# Patient Record
Sex: FEMALE
[De-identification: names, ages, dates, MRNs, and addresses within clinical notes are randomized per-mention and may not be internally consistent; named-entity substitution may affect disease eponyms.]

---

## 2018-01-03 ENCOUNTER — HOSPITAL ENCOUNTER (INPATIENT)
Dept: HOSPITAL 12 - ER | Age: 83
LOS: 7 days | DRG: 177 | End: 2018-01-10
Payer: MEDICARE

## 2018-01-03 VITALS — SYSTOLIC BLOOD PRESSURE: 132 MMHG | DIASTOLIC BLOOD PRESSURE: 56 MMHG

## 2018-01-03 VITALS — HEIGHT: 64 IN | BODY MASS INDEX: 24.75 KG/M2 | WEIGHT: 145 LBS

## 2018-01-03 VITALS — DIASTOLIC BLOOD PRESSURE: 48 MMHG | SYSTOLIC BLOOD PRESSURE: 114 MMHG

## 2018-01-03 DIAGNOSIS — R53.2: ICD-10-CM

## 2018-01-03 DIAGNOSIS — Z66: ICD-10-CM

## 2018-01-03 DIAGNOSIS — F02.80: ICD-10-CM

## 2018-01-03 DIAGNOSIS — R62.7: ICD-10-CM

## 2018-01-03 DIAGNOSIS — D64.9: ICD-10-CM

## 2018-01-03 DIAGNOSIS — I21.A1: ICD-10-CM

## 2018-01-03 DIAGNOSIS — G93.40: ICD-10-CM

## 2018-01-03 DIAGNOSIS — I50.31: ICD-10-CM

## 2018-01-03 DIAGNOSIS — I48.91: ICD-10-CM

## 2018-01-03 DIAGNOSIS — I11.0: ICD-10-CM

## 2018-01-03 DIAGNOSIS — K56.41: ICD-10-CM

## 2018-01-03 DIAGNOSIS — A41.9: ICD-10-CM

## 2018-01-03 DIAGNOSIS — G30.9: ICD-10-CM

## 2018-01-03 DIAGNOSIS — K56.7: ICD-10-CM

## 2018-01-03 DIAGNOSIS — D68.59: ICD-10-CM

## 2018-01-03 DIAGNOSIS — Z88.0: ICD-10-CM

## 2018-01-03 DIAGNOSIS — Z90.710: ICD-10-CM

## 2018-01-03 DIAGNOSIS — E87.2: ICD-10-CM

## 2018-01-03 DIAGNOSIS — E87.6: ICD-10-CM

## 2018-01-03 DIAGNOSIS — N17.0: ICD-10-CM

## 2018-01-03 DIAGNOSIS — J96.01: ICD-10-CM

## 2018-01-03 DIAGNOSIS — E43: ICD-10-CM

## 2018-01-03 DIAGNOSIS — R33.9: ICD-10-CM

## 2018-01-03 DIAGNOSIS — K64.9: ICD-10-CM

## 2018-01-03 DIAGNOSIS — E11.9: ICD-10-CM

## 2018-01-03 DIAGNOSIS — E87.1: ICD-10-CM

## 2018-01-03 DIAGNOSIS — J69.0: Primary | ICD-10-CM

## 2018-01-03 DIAGNOSIS — E03.9: ICD-10-CM

## 2018-01-03 LAB
ALP SERPL-CCNC: 54 U/L (ref 50–136)
ALT SERPL W/O P-5'-P-CCNC: 46 U/L (ref 14–59)
AST SERPL-CCNC: 24 U/L (ref 15–37)
BASOPHILS # BLD AUTO: 0 K/UL (ref 0–8)
BASOPHILS NFR BLD AUTO: 0.1 % (ref 0–2)
BILIRUB DIRECT SERPL-MCNC: 0.2 MG/DL (ref 0–0.2)
BILIRUB SERPL-MCNC: 0.6 MG/DL (ref 0.2–1)
BUN SERPL-MCNC: 50 MG/DL (ref 7–18)
CHLORIDE SERPL-SCNC: 98 MMOL/L (ref 98–107)
CO2 SERPL-SCNC: 25 MMOL/L (ref 21–32)
CREAT SERPL-MCNC: 1.7 MG/DL (ref 0.6–1.3)
EOSINOPHIL # BLD AUTO: 0.1 K/UL (ref 0–0.7)
EOSINOPHIL NFR BLD AUTO: 0.6 % (ref 0–7)
GLUCOSE SERPL-MCNC: 87 MG/DL (ref 74–106)
HCT VFR BLD AUTO: 34.9 % (ref 31.2–41.9)
HGB BLD-MCNC: 11.8 G/DL (ref 10.9–14.3)
LIPASE SERPL-CCNC: 71 U/L (ref 73–393)
LYMPHOCYTES # BLD AUTO: 1.4 K/UL (ref 20–40)
LYMPHOCYTES NFR BLD AUTO: 10.5 % (ref 20.5–51.5)
LYMPHOCYTES NFR BLD MANUAL: 10 % (ref 20–40)
MCH RBC QN AUTO: 29.6 UUG (ref 24.7–32.8)
MCHC RBC AUTO-ENTMCNC: 34 G/DL (ref 32.3–35.6)
MCV RBC AUTO: 87.6 FL (ref 75.5–95.3)
MONOCYTES # BLD AUTO: 1.8 K/UL (ref 2–10)
MONOCYTES NFR BLD AUTO: 14.1 % (ref 0–11)
MONOCYTES NFR BLD MANUAL: 15 % (ref 2–10)
NEUTROPHILS # BLD AUTO: 9.8 K/UL (ref 1.8–8.9)
NEUTROPHILS NFR BLD AUTO: 74.7 % (ref 38.5–71.5)
NEUTS BAND NFR BLD MANUAL: 20 % (ref 0–10)
NEUTS SEG NFR BLD MANUAL: 55 % (ref 42–75)
PLATELET # BLD AUTO: 378 K/UL (ref 179–408)
POTASSIUM SERPL-SCNC: 3.7 MMOL/L (ref 3.5–5.1)
RBC # BLD AUTO: 3.99 MIL/UL (ref 3.63–4.92)
WBC # BLD AUTO: 13.1 K/UL (ref 3.8–11.8)
WS STN SPEC: 5.6 G/DL (ref 6.4–8.2)

## 2018-01-03 PROCEDURE — C1751 CATH, INF, PER/CENT/MIDLINE: HCPCS

## 2018-01-03 PROCEDURE — A4663 DIALYSIS BLOOD PRESSURE CUFF: HCPCS

## 2018-01-03 RX ADMIN — QUETIAPINE PRN MG: 25 TABLET, FILM COATED ORAL at 21:18

## 2018-01-03 RX ADMIN — ATORVASTATIN CALCIUM SCH MG: 40 TABLET, FILM COATED ORAL at 21:17

## 2018-01-03 RX ADMIN — ANORECTAL OINTMENT PRN GM: 15.7; .44; 24; 20.6 OINTMENT TOPICAL at 21:00

## 2018-01-03 RX ADMIN — LACTULOSE SCH G: 20 SOLUTION ORAL at 17:17

## 2018-01-03 RX ADMIN — LACTULOSE SCH G: 20 SOLUTION ORAL at 20:00

## 2018-01-03 RX ADMIN — ACETAMINOPHEN PRN MG: 325 TABLET ORAL at 21:18

## 2018-01-03 RX ADMIN — DEXTROSE SCH MLS/HR: 50 INJECTION, SOLUTION INTRAVENOUS at 17:17

## 2018-01-03 RX ADMIN — LACTULOSE SCH G: 20 SOLUTION ORAL at 23:50

## 2018-01-03 RX ADMIN — ENOXAPARIN SODIUM SCH MG: 30 INJECTION SUBCUTANEOUS at 21:17

## 2018-01-03 RX ADMIN — SODIUM CHLORIDE PRN MLS/HR: 0.9 INJECTION, SOLUTION INTRAVENOUS at 19:00

## 2018-01-03 NOTE — NUR
PT TRANSFERED TO FLOOR IN STABLE CONDITION. PT GORT SOME RELIEF AFTER THE 
[place] CATH DRAINED 800ML URINE OUT.

## 2018-01-03 NOTE — NUR
REDNESS IN THE LABIAL AREA AND DIAPER AREA NOTICED AT THE TIME CHANGING THE 
DIAPER AND GIVING PERINEAL HYGIENE

## 2018-01-04 VITALS — DIASTOLIC BLOOD PRESSURE: 48 MMHG | SYSTOLIC BLOOD PRESSURE: 134 MMHG

## 2018-01-04 VITALS — DIASTOLIC BLOOD PRESSURE: 69 MMHG | SYSTOLIC BLOOD PRESSURE: 133 MMHG

## 2018-01-04 VITALS — DIASTOLIC BLOOD PRESSURE: 76 MMHG | SYSTOLIC BLOOD PRESSURE: 147 MMHG

## 2018-01-04 VITALS — DIASTOLIC BLOOD PRESSURE: 76 MMHG | SYSTOLIC BLOOD PRESSURE: 145 MMHG

## 2018-01-04 VITALS — SYSTOLIC BLOOD PRESSURE: 125 MMHG | DIASTOLIC BLOOD PRESSURE: 59 MMHG

## 2018-01-04 LAB
BASOPHILS # BLD AUTO: 0 K/UL (ref 0–8)
BASOPHILS NFR BLD AUTO: 0.1 % (ref 0–2)
BUN SERPL-MCNC: 36 MG/DL (ref 7–18)
CHLORIDE SERPL-SCNC: 105 MMOL/L (ref 98–107)
CHOLEST SERPL-MCNC: 120 MG/DL (ref ?–200)
CO2 SERPL-SCNC: 24 MMOL/L (ref 21–32)
CREAT SERPL-MCNC: 1.4 MG/DL (ref 0.6–1.3)
EOSINOPHIL # BLD AUTO: 0 K/UL (ref 0–0.7)
EOSINOPHIL NFR BLD AUTO: 0.4 % (ref 0–7)
GLUCOSE SERPL-MCNC: 98 MG/DL (ref 74–106)
HCT VFR BLD AUTO: 37.7 % (ref 31.2–41.9)
HDLC SERPL-MCNC: 51 MG/DL (ref 40–60)
HGB BLD-MCNC: 12.8 G/DL (ref 10.9–14.3)
LYMPHOCYTES # BLD AUTO: 0.5 K/UL (ref 20–40)
LYMPHOCYTES NFR BLD AUTO: 4 % (ref 20.5–51.5)
MAGNESIUM SERPL-MCNC: 1.6 MG/DL (ref 1.8–2.4)
MCH RBC QN AUTO: 29.6 UUG (ref 24.7–32.8)
MCHC RBC AUTO-ENTMCNC: 34 G/DL (ref 32.3–35.6)
MCV RBC AUTO: 87.4 FL (ref 75.5–95.3)
MONOCYTES # BLD AUTO: 0.6 K/UL (ref 2–10)
MONOCYTES NFR BLD AUTO: 5 % (ref 0–11)
NEUTROPHILS # BLD AUTO: 10.7 K/UL (ref 1.8–8.9)
NEUTROPHILS NFR BLD AUTO: 90.5 % (ref 38.5–71.5)
PHOSPHATE SERPL-MCNC: 2.2 MG/DL (ref 2.5–4.9)
PLATELET # BLD AUTO: 427 K/UL (ref 179–408)
POTASSIUM SERPL-SCNC: 3.4 MMOL/L (ref 3.5–5.1)
RBC # BLD AUTO: 4.31 MIL/UL (ref 3.63–4.92)
TRIGL SERPL-MCNC: 98 MG/DL (ref 30–150)
TSH SERPL DL<=0.005 MIU/L-ACNC: 1.74 MIU/ML (ref 0.36–3.74)
WBC # BLD AUTO: 11.8 K/UL (ref 3.8–11.8)

## 2018-01-04 RX ADMIN — MAGNESIUM SULFATE IN DEXTROSE SCH MLS/HR: 10 INJECTION, SOLUTION INTRAVENOUS at 14:08

## 2018-01-04 RX ADMIN — MAGNESIUM SULFATE IN DEXTROSE SCH MLS/HR: 10 INJECTION, SOLUTION INTRAVENOUS at 15:13

## 2018-01-04 RX ADMIN — LACTULOSE SCH G: 20 SOLUTION ORAL at 04:00

## 2018-01-04 RX ADMIN — MAGNESIUM SULFATE IN DEXTROSE SCH MLS/HR: 10 INJECTION, SOLUTION INTRAVENOUS at 13:58

## 2018-01-04 RX ADMIN — ATORVASTATIN CALCIUM SCH MG: 40 TABLET, FILM COATED ORAL at 20:26

## 2018-01-04 RX ADMIN — SODIUM CHLORIDE PRN MLS/HR: 0.9 INJECTION, SOLUTION INTRAVENOUS at 08:28

## 2018-01-04 RX ADMIN — POTASSIUM CHLORIDE SCH MLS/HR: 14.9 INJECTION, SOLUTION INTRAVENOUS at 16:47

## 2018-01-04 RX ADMIN — POTASSIUM CHLORIDE SCH MLS/HR: 14.9 INJECTION, SOLUTION INTRAVENOUS at 17:42

## 2018-01-04 RX ADMIN — ENOXAPARIN SODIUM SCH MG: 30 INJECTION SUBCUTANEOUS at 20:28

## 2018-01-04 RX ADMIN — HYDROCODONE BITARTRATE AND ACETAMINOPHEN PRN TAB: 5; 325 TABLET ORAL at 20:27

## 2018-01-04 RX ADMIN — LACTULOSE SCH G: 20 SOLUTION ORAL at 08:28

## 2018-01-04 RX ADMIN — DEXTROSE SCH MLS/HR: 50 INJECTION, SOLUTION INTRAVENOUS at 17:42

## 2018-01-04 RX ADMIN — Medication SCH MCG: at 06:49

## 2018-01-04 NOTE — NUR
RECEIVED SHIFT REPORT FROM DAY SHIFT NURSE. PATIENT IS A/OX2, COMPLAINING OF ABDOMINAL PAIN. 
PAIN MANAGEMENT WILL BE PROVIDED. STABLE CONDITION, NO S/S OF DISTRESS, VSS. CAREGIVER AT 
BEDSIDE. BED ALARM ON, SIDE RAILS UP X2, BED IN LOCKED/LOW POSITION, CALL LIGHT WITHIN 
REACH. SAFETY/COMFORT WILL BE PROVIDED.

## 2018-01-04 NOTE — NUR
CONTINUES IV TO RIGHT UPPER MIDLINE VIA PUMP; SITE CLEAR.  

CONTINUES BROWN DIARRHEA & RED EXCORIATED PERIRECTAL AREA.

## 2018-01-04 NOTE — NUR
PATIENT BEEN IN BED RESTING. NO S/S OF DISTRESS NOTED DURING MY SHIFT. CAREGIVER AT THE 
BEDSIDE DURING THE DAY. K AND Mg WAS SUPPLEMENTED AS ORDERED. INFLUENZA SWAP WAS TAKEN AND 
SENT TO LAB. F/C DRAINING WELL. IV STILL INTACT, IV FLUID RUNNING. SAFETY AND COMFORT 
PROVIDED BY STAFF. REPORT WAS GIVEN TO JENNIFER HOLLAND.

## 2018-01-05 VITALS — DIASTOLIC BLOOD PRESSURE: 68 MMHG | SYSTOLIC BLOOD PRESSURE: 141 MMHG

## 2018-01-05 VITALS — SYSTOLIC BLOOD PRESSURE: 161 MMHG | DIASTOLIC BLOOD PRESSURE: 72 MMHG

## 2018-01-05 VITALS — DIASTOLIC BLOOD PRESSURE: 75 MMHG | SYSTOLIC BLOOD PRESSURE: 142 MMHG

## 2018-01-05 LAB
ALP SERPL-CCNC: 60 U/L (ref 50–136)
ALT SERPL W/O P-5'-P-CCNC: 41 U/L (ref 14–59)
AST SERPL-CCNC: 29 U/L (ref 15–37)
BASOPHILS # BLD AUTO: 0 K/UL (ref 0–8)
BASOPHILS NFR BLD AUTO: 0.1 % (ref 0–2)
BILIRUB SERPL-MCNC: 0.4 MG/DL (ref 0.2–1)
BUN SERPL-MCNC: 34 MG/DL (ref 7–18)
CHLORIDE SERPL-SCNC: 103 MMOL/L (ref 98–107)
CO2 SERPL-SCNC: 24 MMOL/L (ref 21–32)
CREAT SERPL-MCNC: 1.2 MG/DL (ref 0.6–1.3)
EOSINOPHIL # BLD AUTO: 0 K/UL (ref 0–0.7)
EOSINOPHIL NFR BLD AUTO: 0.2 % (ref 0–7)
GLUCOSE SERPL-MCNC: 113 MG/DL (ref 74–106)
HCT VFR BLD AUTO: 33.9 % (ref 31.2–41.9)
HEMOCCULT STL QL: POSITIVE
HGB BLD-MCNC: 11.6 G/DL (ref 10.9–14.3)
LYMPHOCYTES # BLD AUTO: 1.5 K/UL (ref 20–40)
LYMPHOCYTES NFR BLD AUTO: 10 % (ref 20.5–51.5)
LYMPHOCYTES NFR BLD MANUAL: 7 % (ref 20–40)
MAGNESIUM SERPL-MCNC: 2 MG/DL (ref 1.8–2.4)
MCH RBC QN AUTO: 29.8 UUG (ref 24.7–32.8)
MCHC RBC AUTO-ENTMCNC: 34 G/DL (ref 32.3–35.6)
MCV RBC AUTO: 86.9 FL (ref 75.5–95.3)
MONOCYTES # BLD AUTO: 1.8 K/UL (ref 2–10)
MONOCYTES NFR BLD AUTO: 12.3 % (ref 0–11)
MONOCYTES NFR BLD MANUAL: 15 % (ref 2–10)
NEUTROPHILS # BLD AUTO: 11.6 K/UL (ref 1.8–8.9)
NEUTROPHILS NFR BLD AUTO: 77.4 % (ref 38.5–71.5)
NEUTS BAND NFR BLD MANUAL: 34 % (ref 0–10)
NEUTS SEG NFR BLD MANUAL: 44 % (ref 42–75)
PHOSPHATE SERPL-MCNC: 1.8 MG/DL (ref 2.5–4.9)
PLATELET # BLD AUTO: 413 K/UL (ref 179–408)
POTASSIUM SERPL-SCNC: 3.4 MMOL/L (ref 3.5–5.1)
RBC # BLD AUTO: 3.9 MIL/UL (ref 3.63–4.92)
WBC # BLD AUTO: 15 K/UL (ref 3.8–11.8)
WS STN SPEC: 5.3 G/DL (ref 6.4–8.2)

## 2018-01-05 RX ADMIN — DOCUSATE SODIUM SCH MG: 100 CAPSULE, LIQUID FILLED ORAL at 21:00

## 2018-01-05 RX ADMIN — DOCUSATE SODIUM SCH MG: 100 CAPSULE, LIQUID FILLED ORAL at 21:12

## 2018-01-05 RX ADMIN — SODIUM CHLORIDE PRN MLS/HR: 0.9 INJECTION, SOLUTION INTRAVENOUS at 01:04

## 2018-01-05 RX ADMIN — ATORVASTATIN CALCIUM SCH MG: 40 TABLET, FILM COATED ORAL at 21:12

## 2018-01-05 RX ADMIN — SENNOSIDES SCH TAB: 8.6 TABLET, COATED ORAL at 21:00

## 2018-01-05 RX ADMIN — SENNOSIDES SCH TAB: 8.6 TABLET, COATED ORAL at 21:12

## 2018-01-05 RX ADMIN — SODIUM CHLORIDE PRN MLS/HR: 0.9 INJECTION, SOLUTION INTRAVENOUS at 20:09

## 2018-01-05 RX ADMIN — DEXTROSE SCH MLS/HR: 50 INJECTION, SOLUTION INTRAVENOUS at 18:01

## 2018-01-05 RX ADMIN — QUETIAPINE SCH MG: 25 TABLET, FILM COATED ORAL at 21:13

## 2018-01-05 RX ADMIN — Medication SCH MCG: at 06:29

## 2018-01-05 RX ADMIN — POTASSIUM CHLORIDE SCH MLS/HR: 14.9 INJECTION, SOLUTION INTRAVENOUS at 17:24

## 2018-01-05 RX ADMIN — POTASSIUM CHLORIDE SCH MLS/HR: 14.9 INJECTION, SOLUTION INTRAVENOUS at 18:02

## 2018-01-05 RX ADMIN — SODIUM CHLORIDE PRN MLS/HR: 0.9 INJECTION, SOLUTION INTRAVENOUS at 17:00

## 2018-01-05 RX ADMIN — ENOXAPARIN SODIUM SCH MG: 30 INJECTION SUBCUTANEOUS at 21:14

## 2018-01-05 NOTE — NUR
PATIENT SLEPT INTERMITTENTLY THROUGH THE NIGHT. CONTINUED TO WAKE UP, BE FEARFUL/ANXIOUS 
WITH CONFUSION. REORIENTATION PROVIDED, PATIENT CHANGED 3-4 TIMES THROUGH THE NIGHT AND 
PRESENTED STOOL. STOOL SPECIMEN COLLECTED FOR STOOL OB. STOOL OB RESULTED AND IS POSITIVE 
FOR STOOL OB. PATIENT'S VITALS ARE STABLE, STABLE CONDITION, NO S/S OF DISTRESS. WHITMORE 
EMPTIED. PATIENT CLEANED. BED IN LOCKED/LOW POSITION, SIDE RAILS UP X2, BED ALARM ON, CALL 
LIGHT WITHIN REACH.

## 2018-01-05 NOTE — NUR
PATIENT BEEN IN BED SLEEPING INTERMITTENTLY. NO S/S OF DISTRESS NOTED. CAREGIVER AT THE 
BEDSIDE DURING THE DAY. PATIENT CONTINUE HAVING SOFT BROWN STOOLS, NO BLEEDING WAS NOTED. PT 
EVALUATION WAS DONE. PATIENT CONTINUE TO BE WEAK UNABLE TO WALK WITHOUT ASSISTANCE. 
MEDICATIONS WERE GIVEN AS ORDERED. IV STILL INTACT AND PATENT. SAFETY AND COMFORT PROVIDED 
BY THE STAFF. REPORT GIVEN TO NIGHT SHIFT RN.

## 2018-01-06 VITALS — DIASTOLIC BLOOD PRESSURE: 70 MMHG | SYSTOLIC BLOOD PRESSURE: 153 MMHG

## 2018-01-06 VITALS — SYSTOLIC BLOOD PRESSURE: 153 MMHG | DIASTOLIC BLOOD PRESSURE: 70 MMHG

## 2018-01-06 VITALS — DIASTOLIC BLOOD PRESSURE: 66 MMHG | SYSTOLIC BLOOD PRESSURE: 112 MMHG

## 2018-01-06 VITALS — SYSTOLIC BLOOD PRESSURE: 147 MMHG | DIASTOLIC BLOOD PRESSURE: 59 MMHG

## 2018-01-06 VITALS — DIASTOLIC BLOOD PRESSURE: 74 MMHG | SYSTOLIC BLOOD PRESSURE: 133 MMHG

## 2018-01-06 LAB
BASOPHILS # BLD AUTO: 0 K/UL (ref 0–8)
BASOPHILS NFR BLD AUTO: 0.1 % (ref 0–2)
BUN SERPL-MCNC: 29 MG/DL (ref 7–18)
CHLORIDE SERPL-SCNC: 103 MMOL/L (ref 98–107)
CO2 SERPL-SCNC: 21 MMOL/L (ref 21–32)
CREAT SERPL-MCNC: 0.9 MG/DL (ref 0.6–1.3)
EOSINOPHIL # BLD AUTO: 0.1 K/UL (ref 0–0.7)
EOSINOPHIL NFR BLD AUTO: 0.4 % (ref 0–7)
GLUCOSE SERPL-MCNC: 113 MG/DL (ref 74–106)
HCT VFR BLD AUTO: 34.7 % (ref 31.2–41.9)
HGB BLD-MCNC: 11.6 G/DL (ref 10.9–14.3)
LYMPHOCYTES # BLD AUTO: 1.7 K/UL (ref 20–40)
LYMPHOCYTES NFR BLD AUTO: 13.7 % (ref 20.5–51.5)
MAGNESIUM SERPL-MCNC: 1.6 MG/DL (ref 1.8–2.4)
MCH RBC QN AUTO: 29 UUG (ref 24.7–32.8)
MCHC RBC AUTO-ENTMCNC: 33 G/DL (ref 32.3–35.6)
MCV RBC AUTO: 86.8 FL (ref 75.5–95.3)
MONOCYTES # BLD AUTO: 1.7 K/UL (ref 2–10)
MONOCYTES NFR BLD AUTO: 13.6 % (ref 0–11)
NEUTROPHILS # BLD AUTO: 9 K/UL (ref 1.8–8.9)
NEUTROPHILS NFR BLD AUTO: 72.2 % (ref 38.5–71.5)
PHOSPHATE SERPL-MCNC: 1 MG/DL (ref 2.5–4.9)
PLATELET # BLD AUTO: 409 K/UL (ref 179–408)
POTASSIUM SERPL-SCNC: 3.3 MMOL/L (ref 3.5–5.1)
RBC # BLD AUTO: 3.99 MIL/UL (ref 3.63–4.92)
WBC # BLD AUTO: 12.4 K/UL (ref 3.8–11.8)

## 2018-01-06 RX ADMIN — SODIUM CHLORIDE PRN MLS/HR: 0.9 INJECTION, SOLUTION INTRAVENOUS at 06:25

## 2018-01-06 RX ADMIN — NYSTATIN SCH APPLIC: 100000 CREAM TOPICAL at 20:36

## 2018-01-06 RX ADMIN — ENOXAPARIN SODIUM SCH MG: 30 INJECTION SUBCUTANEOUS at 20:37

## 2018-01-06 RX ADMIN — SENNOSIDES SCH TAB: 8.6 TABLET, COATED ORAL at 20:35

## 2018-01-06 RX ADMIN — LACTULOSE SCH G: 20 SOLUTION ORAL at 13:19

## 2018-01-06 RX ADMIN — ATORVASTATIN CALCIUM SCH MG: 40 TABLET, FILM COATED ORAL at 20:35

## 2018-01-06 RX ADMIN — Medication SCH MCG: at 06:24

## 2018-01-06 RX ADMIN — QUETIAPINE SCH MG: 25 TABLET, FILM COATED ORAL at 20:35

## 2018-01-06 RX ADMIN — LACTULOSE SCH G: 20 SOLUTION ORAL at 17:50

## 2018-01-06 RX ADMIN — MAGNESIUM SULFATE IN DEXTROSE SCH MLS/HR: 10 INJECTION, SOLUTION INTRAVENOUS at 11:10

## 2018-01-06 RX ADMIN — SODIUM CHLORIDE SCH MLS/HR: 9 INJECTION, SOLUTION INTRAVENOUS at 17:50

## 2018-01-06 RX ADMIN — MAGNESIUM SULFATE IN DEXTROSE SCH MLS/HR: 10 INJECTION, SOLUTION INTRAVENOUS at 12:22

## 2018-01-06 RX ADMIN — SODIUM CHLORIDE SCH MLS/HR: 9 INJECTION, SOLUTION INTRAVENOUS at 13:21

## 2018-01-06 RX ADMIN — DOCUSATE SODIUM SCH MG: 100 CAPSULE, LIQUID FILLED ORAL at 20:35

## 2018-01-06 RX ADMIN — DEXTROSE SCH MLS/HR: 50 INJECTION, SOLUTION INTRAVENOUS at 16:57

## 2018-01-06 NOTE — NUR
repositioned q 2h with pillows for support, incontinent of liquid stool, washed and kept 
clean and dry- z guard applied on reddened areas

## 2018-01-06 NOTE — NUR
asleep but arouses easily, oriented to self only, denies of pain, repositioned and readied 
for breakfast, head of bed elevated, blevins cath draining rajesh urine,  safety measures 
maintained- bed alarm on with call light within reach

## 2018-01-06 NOTE — NUR
RECEIVED PATIENT AWAKE IN BED WITH CAREGIVER AT BEDSIDE. PATIENT IS ALERT TO SELF ONLY. 
CONFUSED BUT VERY PLEASANT WHEN APPROACHED. DENIES PAIN. NO S/S OF PAIN OR DISCOMFORT. NO 
RESP. DISTRESS NOTED. F/C INTACT AND PATENT. BED ALARM ON. CALL LIGHT IN REACH. ALL NEEDS 
ATTENDED. WILL CONTINUE TO MONITOR.

## 2018-01-06 NOTE — NUR
resting in bed, no distress noted, all needs attended and met, confused, repositioned q2h 
with heels off loaded with pillows, no redness noted on heels, call ligth within reach, bed 
alarm on

## 2018-01-07 VITALS — SYSTOLIC BLOOD PRESSURE: 117 MMHG | DIASTOLIC BLOOD PRESSURE: 67 MMHG

## 2018-01-07 VITALS — DIASTOLIC BLOOD PRESSURE: 63 MMHG | SYSTOLIC BLOOD PRESSURE: 132 MMHG

## 2018-01-07 VITALS — SYSTOLIC BLOOD PRESSURE: 126 MMHG | DIASTOLIC BLOOD PRESSURE: 59 MMHG

## 2018-01-07 VITALS — DIASTOLIC BLOOD PRESSURE: 59 MMHG | SYSTOLIC BLOOD PRESSURE: 147 MMHG

## 2018-01-07 VITALS — DIASTOLIC BLOOD PRESSURE: 58 MMHG | SYSTOLIC BLOOD PRESSURE: 134 MMHG

## 2018-01-07 LAB
BASOPHILS # BLD AUTO: 0 K/UL (ref 0–8)
BASOPHILS NFR BLD AUTO: 0.1 % (ref 0–2)
BUN SERPL-MCNC: 25 MG/DL (ref 7–18)
CHLORIDE SERPL-SCNC: 102 MMOL/L (ref 98–107)
CO2 SERPL-SCNC: 22 MMOL/L (ref 21–32)
CREAT SERPL-MCNC: 0.8 MG/DL (ref 0.6–1.3)
EOSINOPHIL # BLD AUTO: 0 K/UL (ref 0–0.7)
EOSINOPHIL NFR BLD AUTO: 0.4 % (ref 0–7)
GLUCOSE SERPL-MCNC: 121 MG/DL (ref 74–106)
HCT VFR BLD AUTO: 35 % (ref 31.2–41.9)
HGB BLD-MCNC: 11.6 G/DL (ref 10.9–14.3)
LYMPHOCYTES # BLD AUTO: 1.5 K/UL (ref 20–40)
LYMPHOCYTES NFR BLD AUTO: 12.9 % (ref 20.5–51.5)
MAGNESIUM SERPL-MCNC: 2 MG/DL (ref 1.8–2.4)
MCH RBC QN AUTO: 28.9 UUG (ref 24.7–32.8)
MCHC RBC AUTO-ENTMCNC: 33 G/DL (ref 32.3–35.6)
MCV RBC AUTO: 86.8 FL (ref 75.5–95.3)
MONOCYTES # BLD AUTO: 1.1 K/UL (ref 2–10)
MONOCYTES NFR BLD AUTO: 9.7 % (ref 0–11)
NEUTROPHILS # BLD AUTO: 8.7 K/UL (ref 1.8–8.9)
NEUTROPHILS NFR BLD AUTO: 76.9 % (ref 38.5–71.5)
PHOSPHATE SERPL-MCNC: 1.6 MG/DL (ref 2.5–4.9)
PLATELET # BLD AUTO: 388 K/UL (ref 179–408)
POTASSIUM SERPL-SCNC: 2.8 MMOL/L (ref 3.5–5.1)
RBC # BLD AUTO: 4.03 MIL/UL (ref 3.63–4.92)
WBC # BLD AUTO: 11.3 K/UL (ref 3.8–11.8)

## 2018-01-07 RX ADMIN — QUETIAPINE SCH MG: 25 TABLET, FILM COATED ORAL at 20:37

## 2018-01-07 RX ADMIN — NYSTATIN SCH APPLIC: 100000 CREAM TOPICAL at 20:37

## 2018-01-07 RX ADMIN — LACTULOSE SCH G: 20 SOLUTION ORAL at 12:38

## 2018-01-07 RX ADMIN — DEXTROSE SCH MLS/HR: 50 INJECTION, SOLUTION INTRAVENOUS at 19:12

## 2018-01-07 RX ADMIN — SENNOSIDES SCH TAB: 8.6 TABLET, COATED ORAL at 20:37

## 2018-01-07 RX ADMIN — LACTULOSE SCH G: 20 SOLUTION ORAL at 00:00

## 2018-01-07 RX ADMIN — SODIUM CHLORIDE PRN MLS/HR: 0.9 INJECTION, SOLUTION INTRAVENOUS at 00:26

## 2018-01-07 RX ADMIN — DEXTROSE SCH MLS/HR: 50 INJECTION, SOLUTION INTRAVENOUS at 17:55

## 2018-01-07 RX ADMIN — ATORVASTATIN CALCIUM SCH MG: 40 TABLET, FILM COATED ORAL at 20:37

## 2018-01-07 RX ADMIN — SODIUM CHLORIDE PRN MLS/HR: 0.9 INJECTION, SOLUTION INTRAVENOUS at 21:59

## 2018-01-07 RX ADMIN — DEXTROSE SCH MLS/HR: 50 INJECTION, SOLUTION INTRAVENOUS at 16:35

## 2018-01-07 RX ADMIN — LACTULOSE SCH G: 20 SOLUTION ORAL at 17:39

## 2018-01-07 RX ADMIN — LACTULOSE SCH G: 20 SOLUTION ORAL at 06:17

## 2018-01-07 RX ADMIN — DEXTROSE SCH MLS/HR: 50 INJECTION, SOLUTION INTRAVENOUS at 17:39

## 2018-01-07 RX ADMIN — NYSTATIN SCH APPLIC: 100000 CREAM TOPICAL at 09:13

## 2018-01-07 RX ADMIN — DEXTROSE SCH MLS/HR: 50 INJECTION, SOLUTION INTRAVENOUS at 20:34

## 2018-01-07 RX ADMIN — SODIUM CHLORIDE PRN MLS/HR: 0.9 INJECTION, SOLUTION INTRAVENOUS at 10:35

## 2018-01-07 RX ADMIN — DOCUSATE SODIUM SCH MG: 100 CAPSULE, LIQUID FILLED ORAL at 20:36

## 2018-01-07 RX ADMIN — Medication SCH MCG: at 06:17

## 2018-01-07 RX ADMIN — ENOXAPARIN SODIUM SCH MG: 30 INJECTION SUBCUTANEOUS at 20:37

## 2018-01-07 NOTE — NUR
personal care gier at bedside, incontinent of liquid stool, brownish, cleaned and kept clean 
and dry- z guard and nystatin cream applied to reddened areas

## 2018-01-07 NOTE — NUR
resting in bed, daughter and personal care giver at bedside, no distress noted, had a large 
bowel movement, washed and kept clean and dry, all needs attended and met, repositioned q 2h 
with heels off loaded with pillows

## 2018-01-07 NOTE — NUR
awake, confused, denies of pain, repositioned for breakfast and fed by cna, aspiration 
precautions observed, head of bed elevated

## 2018-01-07 NOTE — NUR
RECEIVED PATIENT AWAKE IN BED WITH PRIVATE CAREGIVER AT BEDSIDE. PATIENT IS ALERT TO SELF 
ONLY. VERY CONFUSED AND DISORIENTED, BUT APPROPRIATE AND PLEASANT WHEN APPROACHED. NEEDS 
FREQUENT REDIRECTION DUE TO MENTAL STATUS. DENIES PAIN. NO FACIAL GRIMACE NOTED. VSS. F/C 
INTACT AND PATENT. H/L INTACT. BED ALARM ON. CALL LIGHT IN REACH. ALL NEEDS ATTENDED. WILL 
CONTINUE TO MONITOR.

## 2018-01-08 VITALS — DIASTOLIC BLOOD PRESSURE: 74 MMHG | SYSTOLIC BLOOD PRESSURE: 152 MMHG

## 2018-01-08 VITALS — SYSTOLIC BLOOD PRESSURE: 135 MMHG | DIASTOLIC BLOOD PRESSURE: 66 MMHG

## 2018-01-08 VITALS — SYSTOLIC BLOOD PRESSURE: 128 MMHG | DIASTOLIC BLOOD PRESSURE: 68 MMHG

## 2018-01-08 VITALS — DIASTOLIC BLOOD PRESSURE: 51 MMHG | SYSTOLIC BLOOD PRESSURE: 120 MMHG

## 2018-01-08 LAB
BASOPHILS # BLD AUTO: 0 K/UL (ref 0–8)
BASOPHILS # BLD AUTO: 0.1 K/UL (ref 0–8)
BASOPHILS NFR BLD AUTO: 0 % (ref 0–2)
BASOPHILS NFR BLD AUTO: 0.3 % (ref 0–2)
BUN SERPL-MCNC: 29 MG/DL (ref 7–18)
BUN SERPL-MCNC: 36 MG/DL (ref 7–18)
CHLORIDE SERPL-SCNC: 102 MMOL/L (ref 98–107)
CHLORIDE SERPL-SCNC: 99 MMOL/L (ref 98–107)
CO2 SERPL-SCNC: 20 MMOL/L (ref 21–32)
CO2 SERPL-SCNC: 20 MMOL/L (ref 21–32)
CREAT SERPL-MCNC: 0.8 MG/DL (ref 0.6–1.3)
CREAT SERPL-MCNC: 1.3 MG/DL (ref 0.6–1.3)
EOSINOPHIL # BLD AUTO: 0 K/UL (ref 0–0.7)
EOSINOPHIL # BLD AUTO: 0 K/UL (ref 0–0.7)
EOSINOPHIL NFR BLD AUTO: 0 % (ref 0–7)
EOSINOPHIL NFR BLD AUTO: 0 % (ref 0–7)
GLUCOSE SERPL-MCNC: 147 MG/DL (ref 74–106)
GLUCOSE SERPL-MCNC: 225 MG/DL (ref 74–106)
HCT VFR BLD AUTO: 36.6 % (ref 31.2–41.9)
HCT VFR BLD AUTO: 38.5 % (ref 31.2–41.9)
HEMOCCULT STL QL: POSITIVE
HGB BLD-MCNC: 12.3 G/DL (ref 10.9–14.3)
HGB BLD-MCNC: 13.2 G/DL (ref 10.9–14.3)
LYMPHOCYTES # BLD AUTO: 0.4 K/UL (ref 20–40)
LYMPHOCYTES # BLD AUTO: 0.6 K/UL (ref 20–40)
LYMPHOCYTES NFR BLD AUTO: 3.1 % (ref 20.5–51.5)
LYMPHOCYTES NFR BLD AUTO: 3.1 % (ref 20.5–51.5)
LYMPHOCYTES NFR BLD MANUAL: 7 % (ref 20–40)
MAGNESIUM SERPL-MCNC: 1.7 MG/DL (ref 1.8–2.4)
MCH RBC QN AUTO: 29.5 UUG (ref 24.7–32.8)
MCH RBC QN AUTO: 30.1 UUG (ref 24.7–32.8)
MCHC RBC AUTO-ENTMCNC: 34 G/DL (ref 32.3–35.6)
MCHC RBC AUTO-ENTMCNC: 34 G/DL (ref 32.3–35.6)
MCV RBC AUTO: 87.5 FL (ref 75.5–95.3)
MCV RBC AUTO: 88 FL (ref 75.5–95.3)
MONOCYTES # BLD AUTO: 1 K/UL (ref 2–10)
MONOCYTES # BLD AUTO: 1.8 K/UL (ref 2–10)
MONOCYTES NFR BLD AUTO: 7.4 % (ref 0–11)
MONOCYTES NFR BLD AUTO: 9.9 % (ref 0–11)
MONOCYTES NFR BLD MANUAL: 8 % (ref 2–10)
NEUTROPHILS # BLD AUTO: 12.6 K/UL (ref 1.8–8.9)
NEUTROPHILS # BLD AUTO: 15.9 K/UL (ref 1.8–8.9)
NEUTROPHILS NFR BLD AUTO: 86.7 % (ref 38.5–71.5)
NEUTROPHILS NFR BLD AUTO: 89.5 % (ref 38.5–71.5)
NEUTS SEG NFR BLD MANUAL: 85 % (ref 42–75)
PHOSPHATE SERPL-MCNC: 2 MG/DL (ref 2.5–4.9)
PLATELET # BLD AUTO: 378 K/UL (ref 179–408)
PLATELET # BLD AUTO: 410 K/UL (ref 179–408)
POTASSIUM SERPL-SCNC: 3.4 MMOL/L (ref 3.5–5.1)
POTASSIUM SERPL-SCNC: 3.8 MMOL/L (ref 3.5–5.1)
RBC # BLD AUTO: 4.18 MIL/UL (ref 3.63–4.92)
RBC # BLD AUTO: 4.38 MIL/UL (ref 3.63–4.92)
WBC # BLD AUTO: 14 K/UL (ref 3.8–11.8)
WBC # BLD AUTO: 18.4 K/UL (ref 3.8–11.8)

## 2018-01-08 RX ADMIN — QUETIAPINE SCH MG: 25 TABLET, FILM COATED ORAL at 22:58

## 2018-01-08 RX ADMIN — SODIUM CHLORIDE SCH MLS/HR: 9 INJECTION, SOLUTION INTRAVENOUS at 14:40

## 2018-01-08 RX ADMIN — NYSTATIN SCH APPLIC: 100000 CREAM TOPICAL at 08:13

## 2018-01-08 RX ADMIN — DEXTROSE SCH MLS/HR: 50 INJECTION, SOLUTION INTRAVENOUS at 16:42

## 2018-01-08 RX ADMIN — SODIUM CHLORIDE PRN MLS/HR: 0.9 INJECTION, SOLUTION INTRAVENOUS at 10:18

## 2018-01-08 RX ADMIN — LACTULOSE SCH G: 20 SOLUTION ORAL at 11:13

## 2018-01-08 RX ADMIN — ATORVASTATIN CALCIUM SCH MG: 40 TABLET, FILM COATED ORAL at 22:57

## 2018-01-08 RX ADMIN — Medication SCH MCG: at 06:05

## 2018-01-08 RX ADMIN — LACTULOSE SCH G: 20 SOLUTION ORAL at 05:11

## 2018-01-08 RX ADMIN — SODIUM CHLORIDE SCH MLS/HR: 9 INJECTION, SOLUTION INTRAVENOUS at 11:30

## 2018-01-08 RX ADMIN — LACTULOSE SCH G: 20 SOLUTION ORAL at 17:10

## 2018-01-08 RX ADMIN — SENNOSIDES SCH TAB: 8.6 TABLET, COATED ORAL at 22:57

## 2018-01-08 RX ADMIN — DOCUSATE SODIUM SCH MG: 100 CAPSULE, LIQUID FILLED ORAL at 22:57

## 2018-01-08 RX ADMIN — ENOXAPARIN SODIUM SCH MG: 30 INJECTION SUBCUTANEOUS at 21:00

## 2018-01-08 RX ADMIN — LACTULOSE SCH G: 20 SOLUTION ORAL at 00:11

## 2018-01-08 RX ADMIN — NYSTATIN SCH APPLIC: 100000 CREAM TOPICAL at 22:58

## 2018-01-08 NOTE — NUR
RECEIVED REPORT FROM NURSE REGARDING PATIENT. RECEIVED INFORMATION THAT PATIENTS DISCHARGE 
IS ON HOLD DUE TO POSITIVE STOOL FOR OB. WAITING FOR PATIENT TO HAVE ANOTHER STOOL AND 
ASSESS IS BLOOD IS STILL PRESENT. WILL CONTINUE TO MONITOR.

## 2018-01-08 NOTE — NUR
PATIENT APPEARS MORE AWAKE AND ALERT AT THIS TIME. DAUGHTER AT BEDSIDE. CALLED OUT TO 
EXCHANGE TO MD ON CALL FOR FURTHER ORDERS.

## 2018-01-08 NOTE — NUR
CODE STROKE PROTOCOL ORDERS COMPLETE. CT SCAN DONE, C-XRAY DONE, LABS PER PROTOCOL DRAWN. 
BRONSON MANZO NP, AWARE AND NOTIFIED. NP AWARE OF CHEST X-RAY RESULTS. RECEIVED ADDITIONAL 
ORDER FOR PATIENT TO HAVE CT SCAN OF ABDOMEN. WILL CONTINUE TO MONITOR.

## 2018-01-08 NOTE — NUR
PATIENT IN BED AWAKE DAUGHTER IS AT BED SIDE, PATIENT CHANGED 3-4 TIMES THROUGH THE DAY AND 
PRESENTED STOOL. STOOL SPECIMEN COLLECTED FOR STOOL OB. STOOL OB RESULTED AND IS POSITIVE 
FOR STOOL OB.MD MADE AWARE, PATIENT'S VITALS ARE STABLE, STABLE CONDITION, NO S/S OF 
DISTRESS. . BED IN LOCKED/LOW POSITION, SIDE RAILS UP X2, BED ALARM ON, CALL LIGHT WITHIN 
REACH.

## 2018-01-08 NOTE — NUR
BRONSON MANZO NP ON CALL FOR DR. DARIUS FATIMA. NOTIFIED OF PATIENTS CHANGE OF CONDITION 
AND CODE STROKE. WAITING FOR CT RESULTS.

## 2018-01-08 NOTE — NUR
RECEIVED PATIENT AWAKE IN BED. DAUGHTER AND PRIMARY CAREGIVER PRESENT AT BEDSIDE. UPON FIRST 
LOOK AT PATIENT, EYES GLASSY, SMALL FACIAL DROOP NOTED TO RIGHT SIDE OF MOUTH, PATIENT 
UNABLE TO SPEAK ANY WORDS AT THIS TIME. VS TAKEN AND WNL. ON RA SATING 95%. PATIENT APPEARS 
VERY WEAK AND FLAT AFFECT NOTED. THIS IS 3RD NIGHT AS PATIENTS PRIMARY NURSE AND THERE IS  
DEFINITELY A DIFFERENT STATUS TONIGHT. NOTIFIED RN CHARGE AND CALLED OUT TO MD ON CALL FOR 
FURTHER ORDERS. PATIENTS DAUGHTER IS CONCERNED THAT PATIENT MIGHT HAVE HAD A "POSSIBLE 
STROKE." NO STROKE HISTORY NOTED. PATIENT DAUGHTER STATED, "MY MOM HAS BEEN DIFFERENT SINCE 
THIS AFTERNOON." WILL CONTINUE TO MONITOR.

## 2018-01-08 NOTE — NUR
MD CALLED AGAIN FOR FURTHER ORDERS. WAITING ON CALL BACK. RN CHARGE AND RN SUPERVISOR AWARE 
OF PATIENTS CONDITION AND UNABLE TO GET A HOLD OG ON-CALL MD/NP AT THIS TIME.

## 2018-01-08 NOTE — NUR
PATIENT APPEARS WITHDRAWN AGAIN. NIHSS SCALE PERFORM AND PATIENT ASSESS. RN CHARGE AT 
BEDSIDE TO ASSIST IN STROKE ASSESSMENT SCALE. PATIENT UNABLE TO MOVE ANY EXTREMITIES, 
EXTREMELY WEAK AND FLACCID. CALLED CODE STROKE.

## 2018-01-08 NOTE — NUR
PATIENT ASLEEP. EASILY AROUSABLE. IVF INFUSING WELL. VSS. BED ALARM ON. CALL LIGHT IN REACH. 
ALL NEEDS ATTENDED. WILL CONTINUE TO MONITOR.

## 2018-01-08 NOTE — NUR
RESULTS BACK FROM CT. NO ACUTE BLEED NOTED. NO ACUTE MAJOR INFARCT SEEN. RN CHARGE AND RN 
SUPERVISOR AWARE OF RESULTS. CALLED OUT TO NP FOR FURTHER ORDERS.

## 2018-01-08 NOTE — NUR
NP AWARE THAT PATIENTS WHITMORE WAS REMOVED TODAY DURING DAYSHIFT AROUND 1400. PATIENT HAS NOT 
URINATED SINCE THEN. ABDOMEN FIRM AND DISTENDED. CHECKED PATIENT WITH BLADDER SCANNER AND 
RECEIVED 0cC OF RETENTION. RECEIVED VERBAL ORDER TO RE-INSERT F/C. F/C INSERTED AND RECEIVED 
300cc OF YELLOW URINE. WILL CONTINUE TO MONITOR.

## 2018-01-09 VITALS — DIASTOLIC BLOOD PRESSURE: 58 MMHG | SYSTOLIC BLOOD PRESSURE: 104 MMHG

## 2018-01-09 VITALS — DIASTOLIC BLOOD PRESSURE: 86 MMHG | SYSTOLIC BLOOD PRESSURE: 101 MMHG

## 2018-01-09 VITALS — SYSTOLIC BLOOD PRESSURE: 103 MMHG | DIASTOLIC BLOOD PRESSURE: 68 MMHG

## 2018-01-09 VITALS — DIASTOLIC BLOOD PRESSURE: 108 MMHG | SYSTOLIC BLOOD PRESSURE: 143 MMHG

## 2018-01-09 VITALS — SYSTOLIC BLOOD PRESSURE: 94 MMHG | DIASTOLIC BLOOD PRESSURE: 59 MMHG

## 2018-01-09 VITALS — SYSTOLIC BLOOD PRESSURE: 131 MMHG | DIASTOLIC BLOOD PRESSURE: 72 MMHG

## 2018-01-09 VITALS — DIASTOLIC BLOOD PRESSURE: 76 MMHG | SYSTOLIC BLOOD PRESSURE: 133 MMHG

## 2018-01-09 VITALS — DIASTOLIC BLOOD PRESSURE: 37 MMHG | SYSTOLIC BLOOD PRESSURE: 151 MMHG

## 2018-01-09 VITALS — SYSTOLIC BLOOD PRESSURE: 109 MMHG | DIASTOLIC BLOOD PRESSURE: 33 MMHG

## 2018-01-09 VITALS — DIASTOLIC BLOOD PRESSURE: 64 MMHG | SYSTOLIC BLOOD PRESSURE: 134 MMHG

## 2018-01-09 VITALS — DIASTOLIC BLOOD PRESSURE: 63 MMHG | SYSTOLIC BLOOD PRESSURE: 83 MMHG

## 2018-01-09 VITALS — DIASTOLIC BLOOD PRESSURE: 47 MMHG | SYSTOLIC BLOOD PRESSURE: 102 MMHG

## 2018-01-09 VITALS — SYSTOLIC BLOOD PRESSURE: 122 MMHG | DIASTOLIC BLOOD PRESSURE: 48 MMHG

## 2018-01-09 LAB
BASE EXCESS BLDA CALC-SCNC: -10.8 MMOL/L
BASOPHILS # BLD AUTO: 0 K/UL (ref 0–8)
BASOPHILS NFR BLD AUTO: 0.1 % (ref 0–2)
BUN SERPL-MCNC: 39 MG/DL (ref 7–18)
CHLORIDE SERPL-SCNC: 98 MMOL/L (ref 98–107)
CO2 SERPL-SCNC: 20 MMOL/L (ref 21–32)
CREAT SERPL-MCNC: 1.3 MG/DL (ref 0.6–1.3)
EOSINOPHIL # BLD AUTO: 0 K/UL (ref 0–0.7)
EOSINOPHIL NFR BLD AUTO: 0 % (ref 0–7)
GLUCOSE SERPL-MCNC: 180 MG/DL (ref 74–106)
HCO3 BLDA-SCNC: 12 MMOL/L
HCT VFR BLD AUTO: 38.9 % (ref 31.2–41.9)
HGB BLD-MCNC: 13.4 G/DL (ref 10.9–14.3)
HGB BLDA OXIMETRY-MCNC: 14 G/DL (ref 12–16)
INHALED O2 CONCENTRATION: 100 %
INHALED O2 FLOW RATE: 15 L/MIN (ref 0–30)
LYMPHOCYTES # BLD AUTO: 0.7 K/UL (ref 20–40)
LYMPHOCYTES NFR BLD AUTO: 2.5 % (ref 20.5–51.5)
MAGNESIUM SERPL-MCNC: 2 MG/DL (ref 1.8–2.4)
MCH RBC QN AUTO: 30.3 UUG (ref 24.7–32.8)
MCHC RBC AUTO-ENTMCNC: 34 G/DL (ref 32.3–35.6)
MCV RBC AUTO: 88.3 FL (ref 75.5–95.3)
MONOCYTES # BLD AUTO: 0.8 K/UL (ref 2–10)
MONOCYTES NFR BLD AUTO: 3.1 % (ref 0–11)
NEUTROPHILS # BLD AUTO: 25.3 K/UL (ref 1.8–8.9)
NEUTROPHILS NFR BLD AUTO: 94.3 % (ref 38.5–71.5)
PCO2 TEMP ADJ BLDA: 20.9 MMHG (ref 35–45)
PH TEMP ADJ BLDA: 7.38 [PH] (ref 7.35–7.45)
PHOSPHATE SERPL-MCNC: 3.5 MG/DL (ref 2.5–4.9)
PLATELET # BLD AUTO: 408 K/UL (ref 179–408)
PO2 TEMP ADJ BLDA: 78.9 MMHG (ref 75–100)
POTASSIUM SERPL-SCNC: 4 MMOL/L (ref 3.5–5.1)
RBC # BLD AUTO: 4.41 MIL/UL (ref 3.63–4.92)
SPECIMEN DRAWN FROM PATIENT: (no result)
WBC # BLD AUTO: 26.8 K/UL (ref 3.8–11.8)

## 2018-01-09 RX ADMIN — ANORECTAL OINTMENT PRN APPLIC: 15.7; .44; 24; 20.6 OINTMENT TOPICAL at 08:45

## 2018-01-09 RX ADMIN — LACTULOSE SCH G: 20 SOLUTION ORAL at 05:06

## 2018-01-09 RX ADMIN — LACTULOSE SCH G: 20 SOLUTION ORAL at 18:00

## 2018-01-09 RX ADMIN — LACTULOSE SCH G: 20 SOLUTION ORAL at 12:00

## 2018-01-09 RX ADMIN — DEXTROSE SCH MLS/HR: 50 INJECTION, SOLUTION INTRAVENOUS at 18:11

## 2018-01-09 RX ADMIN — LACTULOSE SCH G: 20 SOLUTION ORAL at 12:23

## 2018-01-09 RX ADMIN — ATORVASTATIN CALCIUM SCH MG: 40 TABLET, FILM COATED ORAL at 20:31

## 2018-01-09 RX ADMIN — HYDROCODONE BITARTRATE AND ACETAMINOPHEN PRN TAB: 5; 325 TABLET ORAL at 09:18

## 2018-01-09 RX ADMIN — Medication SCH MCG: at 06:10

## 2018-01-09 RX ADMIN — NYSTATIN SCH APPLIC: 100000 CREAM TOPICAL at 08:45

## 2018-01-09 RX ADMIN — QUETIAPINE PRN MG: 25 TABLET, FILM COATED ORAL at 08:44

## 2018-01-09 RX ADMIN — NYSTATIN SCH APPLIC: 100000 CREAM TOPICAL at 21:04

## 2018-01-09 RX ADMIN — LACTULOSE SCH G: 20 SOLUTION ORAL at 00:00

## 2018-01-09 RX ADMIN — ACETAMINOPHEN PRN MG: 325 TABLET ORAL at 05:06

## 2018-01-09 RX ADMIN — ENOXAPARIN SODIUM SCH MG: 30 INJECTION SUBCUTANEOUS at 20:32

## 2018-01-09 RX ADMIN — DOCUSATE SODIUM SCH MG: 100 CAPSULE, LIQUID FILLED ORAL at 20:31

## 2018-01-09 RX ADMIN — SODIUM CHLORIDE SCH MLS/HR: 9 INJECTION, SOLUTION INTRAVENOUS at 15:20

## 2018-01-09 RX ADMIN — SENNOSIDES SCH TAB: 8.6 TABLET, COATED ORAL at 20:32

## 2018-01-09 RX ADMIN — SODIUM CHLORIDE PRN MLS/HR: 0.9 INJECTION, SOLUTION INTRAVENOUS at 13:03

## 2018-01-09 RX ADMIN — SODIUM CHLORIDE PRN MLS/HR: 0.9 INJECTION, SOLUTION INTRAVENOUS at 00:00

## 2018-01-09 NOTE — NUR
DR HARRIS   CALL BACK PATIENT CONDITION AND ABG RESULT INFORM  NO NEW ORDER  CLOSED 
OBSERVATION FAMILY AT BEDSIDE

## 2018-01-09 NOTE — NUR
AWAKE CONFUSED BUT ABLE TO FOLLOW SIMPLE DIRECTION ON ASPIRATION AND FALL PRECAUTION BED 
ALARM ON AND CALL LIGHT IN REACH

## 2018-01-09 NOTE — NUR
PATIENT AWAKE IN BED. BASELINE WNL. PATIENT HAS NOT SLEPT THROUGHOUT THE NIGHT, ONLY REST 
PERIODS. C/O DISCOMFORT IN BACK. REPOSITIONED TO COMFORT.  PATIENT GIVEN TYLENOL 650MG PO 
PRN. WILL CONTINUE TO MONITOR.

## 2018-01-09 NOTE — NUR
RECEIVED CALL FROM OUTSIDE NEUROLOGIST,DR. PLASENCIA, REGARDING, "CODE STROKE," AND MD GIVEN 
FULL DETAILED REPORT ON PATIENTS STATUS AND MEDICAL HISTORY. MD REVIEWED ORDERS AND AWARE OF 
RESULTS. NO NEW ORDERS GIVEN AT THIS TIME. MD NOTIFIED THAT PATIENTS DAUGHTER FIRST NOTICED 
A CHANGE IN HER MOM AROUND 1630 THIS AFTERNOON. RN CHARGE AND RN NURSING SUPERVISOR NOTIFIED 
AND AWARE. WILL CONTINUE TO MONITOR. DAUGHTER AT BEDSIDE. PATIENT DID NOT TAKE ANY HS MEDS 
TONIGHT, DUE TO STATUS OF PATIENT. WILL CONTINUE TO MONITOR AND CLOSELY ASSESS.

## 2018-01-09 NOTE — NUR
RECEIVED PATIENT SLEEPING IN BED WITH DAUGHTER AND PRIMARY CAREGIVER AT BEDSIDE. PATIENT IS 
ON TELE SR. ON NON-REBREATHER MASK 15L SATING 100%. BP IN THE 90'S. MD AWARE OF BP. NO RESP. 
DISTRESS NOTED. PATIENT APPEARS COMFORTABLE. WILL CONTINUE TO MONITOR AND CLOSELY OBSERVE. 
ALL NEEDS ATTENDED.

## 2018-01-09 NOTE — NUR
PATIENT REPOSITIONED TO SIDE/COMFORT. ON NON-REBREATHER 15L SATING 100%. ON CONTINUOS PULSE 
OX. WILL CONTINUE TO MONITOR.

## 2018-01-09 NOTE — NUR
SOME RESTLESS VS TAKEN O2 SAT WAS VERY LOW 76% ON 3L  INCREASE TO NBM 15L AND CLOSED 
OBSERVATION IT WAS UP TO 94% AND RT WAS CALL TO  HELP TO TITRATE O2

## 2018-01-09 NOTE — NUR
CLINICAL PHARMACY NOTE:VANCOMYCIN DOSING



Request for vancomycin dosing on 87 y/o female 5'4" 145 LBS FOR pneumonia



Temp 98F  BUN 39  Scr 1.3  WBC 18.4 also on Merrem and Levaquin



Give Vancomycin 1gm IVPB today. Random vancomycin level ordered for tomorrow. Will dose by 
levels

## 2018-01-09 NOTE — NUR
PT ASSIST EXCERCISE /DANGLE BUT  VERY WEAK UNABLE TO AMBULATE     VS TAKEN STABLE NO SOB    
C/O OF BACK PAIN MED PO  GIVEN AS ORDER

## 2018-01-09 NOTE — NUR
TRANS TO TELE AT THIS TIME RESTING  STABLE CONDITION SAFETY MEASURE PROVIDED BED ALARM ON 
CALL BELL IN REACH CARE TAKER AT BEDSIDE

## 2018-01-09 NOTE — NUR
DR MIRELES SEE PATIENT NEW ORDER IN CHART   O2 SAT WAS LOWER TO 81-84  RT WAS CALL AND  
REPOSITION  HOB UP AND CHANGE O2 SAT SENSER  THEN RECHECK O2 SAT WAS 95% WITH NBM 15L/MIN AT 
THIS TIME

## 2018-01-10 VITALS — DIASTOLIC BLOOD PRESSURE: 46 MMHG | SYSTOLIC BLOOD PRESSURE: 86 MMHG

## 2018-01-10 VITALS — DIASTOLIC BLOOD PRESSURE: 44 MMHG | SYSTOLIC BLOOD PRESSURE: 142 MMHG

## 2018-01-10 LAB
ALP SERPL-CCNC: 89 U/L (ref 50–136)
ALT SERPL W/O P-5'-P-CCNC: 91 U/L (ref 14–59)
APPEARANCE UR: (no result)
AST SERPL-CCNC: 78 U/L (ref 15–37)
BASOPHILS # BLD AUTO: 0 K/UL (ref 0–8)
BASOPHILS NFR BLD AUTO: 0.1 % (ref 0–2)
BILIRUB DIRECT SERPL-MCNC: 0.1 MG/DL (ref 0–0.2)
BILIRUB SERPL-MCNC: 0.4 MG/DL (ref 0.2–1)
BILIRUB SERPL-MCNC: 0.4 MG/DL (ref 0.2–1)
BILIRUB UR QL STRIP: NEGATIVE
BUN SERPL-MCNC: 55 MG/DL (ref 7–18)
CHLORIDE SERPL-SCNC: 100 MMOL/L (ref 98–107)
CO2 SERPL-SCNC: 18 MMOL/L (ref 21–32)
COLOR UR: (no result)
CREAT SERPL-MCNC: 2.2 MG/DL (ref 0.6–1.3)
DEPRECATED SQUAMOUS URNS QL MICRO: (no result) /HPF
EOSINOPHIL # BLD AUTO: 0 K/UL (ref 0–0.7)
EOSINOPHIL NFR BLD AUTO: 0.1 % (ref 0–7)
GLUCOSE SERPL-MCNC: 134 MG/DL (ref 74–106)
GLUCOSE UR STRIP-MCNC: NEGATIVE MG/DL
HCT VFR BLD AUTO: 35.2 % (ref 31.2–41.9)
HGB BLD-MCNC: 12 G/DL (ref 10.9–14.3)
HGB UR QL STRIP: (no result)
KETONES UR STRIP-MCNC: (no result) MG/DL
LEUKOCYTE ESTERASE UR QL STRIP: NEGATIVE
LYMPHOCYTES # BLD AUTO: 0.5 K/UL (ref 20–40)
LYMPHOCYTES NFR BLD AUTO: 2 % (ref 20.5–51.5)
LYMPHOCYTES NFR BLD MANUAL: 0 % (ref 20–40)
MAGNESIUM SERPL-MCNC: 2.1 MG/DL (ref 1.8–2.4)
MCH RBC QN AUTO: 30.2 UUG (ref 24.7–32.8)
MCHC RBC AUTO-ENTMCNC: 34 G/DL (ref 32.3–35.6)
MCV RBC AUTO: 88.4 FL (ref 75.5–95.3)
METAMYELOCYTES NFR BLD MANUAL: 1 % (ref 0–1)
MONOCYTES # BLD AUTO: 0.5 K/UL (ref 2–10)
MONOCYTES NFR BLD AUTO: 2.4 % (ref 0–11)
MONOCYTES NFR BLD MANUAL: 3 % (ref 2–10)
NEUTROPHILS # BLD AUTO: 22.2 K/UL (ref 1.8–8.9)
NEUTROPHILS NFR BLD AUTO: 95.4 % (ref 38.5–71.5)
NEUTS BAND NFR BLD MANUAL: 25 % (ref 0–10)
NEUTS SEG NFR BLD MANUAL: 71 % (ref 42–75)
NITRITE UR QL STRIP: NEGATIVE
PH UR STRIP: 5 [PH] (ref 5–8)
PHOSPHATE SERPL-MCNC: 5.3 MG/DL (ref 2.5–4.9)
PLATELET # BLD AUTO: 383 K/UL (ref 179–408)
POTASSIUM SERPL-SCNC: 4.5 MMOL/L (ref 3.5–5.1)
PROT UR QL STRIP: (no result)
RBC # BLD AUTO: 3.98 MIL/UL (ref 3.63–4.92)
SP GR UR STRIP: 1.01 (ref 1–1.03)
UROBILINOGEN UR STRIP-MCNC: 1 E.U./DL
WBC # BLD AUTO: 23.3 K/UL (ref 3.8–11.8)
WBC #/AREA URNS HPF: (no result) /HPF
WBC #/AREA URNS HPF: (no result) /HPF (ref 0–3)
WS STN SPEC: 4.8 G/DL (ref 6.4–8.2)

## 2018-01-10 RX ADMIN — SODIUM CHLORIDE SCH MLS/HR: 9 INJECTION, SOLUTION INTRAVENOUS at 03:01

## 2018-01-10 RX ADMIN — NYSTATIN SCH APPLIC: 100000 CREAM TOPICAL at 08:38

## 2018-01-10 NOTE — NUR
PATIENT RESTING IN BED. SLEEPING ON AND OFF. CONTINUED ON NON-REBREATHER AT 15L NC SATING 
%. PATIENT IS ON CONTINUOUS PULSE OX. /44. ON TELE -110. NO FACIAL 
GRIMACE. NO S/S OF PAIN OR DISCOMFORT. REPOSITIONED TO SIDE AND MADE COMFORTABLE. BED ALARM 
ON. ALL NEEDS ATTENDED. WILL CONTINUE TO MONITOR.

## 2018-01-10 NOTE — NUR
NOTED PATIENT REMOVING THE OXYGEN REBREATHING MASK, REORIENTED THE PATIENT, PLACED THE MASK 
BACK ON WITH OXYGEN SATURATION OF 90 PERCENT. STILL PATIENT'S SKIN NOTED TO BE COLD AND 
CLAMMY. KEPT PATIENT COMFORTABLE. CALL LIGHT PLACED WITHIN REACH. CONTINUE TO MONITOR 
PATIENT CLOSELY.

## 2018-01-10 NOTE — NUR
[facility] MORTUARY PERSONNEL ARRIVED AND PICKED- UP THE BODY WHICH WAS PLACED IN THE BODY 
BAG, CLEAN WITH BAG TAG, BODY TAG AND TOE TAG. WITNESSED BY 2 NURSES. NO JEWELRIES OR ANY 
BELONGINGS NOTED. HOUSE SUPERVISOR AND DAUGHTER WERE NOTIFIED THAT THE BODY HAS BEEN PICKED 
UP.

## 2018-01-10 NOTE — NUR
RECEIVED PATIENT ON BED ON A SEMI- CASTELAN'S POSITION WITH NON REBREATHING MASK ON AT 15 
L/MIN. PATIENT HAS EPISODES OF REMOVING THE MASK PER NIGHT SHIFT. PATIENT'S OXYGEN 
SATURATION IS 98 PERCENT AT 15L/MIN. BP: 110/69 PULSE RATE OF 78. PATIENT'S SKIN NOTED COLD 
AND CLAMMY. NOTED WHITMORE CATHETER INTACT AND PATENT WITH YELLOW COLORED URINE FLOWING WITH NO 
SEDIMENTS NOTED ON THE URINE BAD, HANG ON THE BED FRAME. CALL LIGHT PLACED WITHIN REACH. 
WILL CONTINUE TO MONITOR CLOSELY THE PATIENT.

## 2018-01-10 NOTE — NUR
HEPLOCK NOTED TO RIGHT HAND, LEAKING. REMOVED. UNABLE TO RESTART. PATIENT HAS ORDER FOR 
MID-LINE. WILL ENDORSE TO AM SHIFT.

## 2018-01-10 NOTE — NUR
PATIENT NOTED AWAKE, PATIENT NOTED TO BE TACHYCARDIC WITH HEART RATE  AS PER IN THE 
MONITOR. BP NOTED 107/62 WITH OXYGEN SATURATION OF 80 PERCENT AT 15L/MIN VIA NON REBREATHING 
MASK, CARE GIVER NOTED AT THE BEDSIDE PROVIDING SUPPORT TO THE PATIENT. PATIENT IS NOTED TO 
BE DIAPHORETIC SKIN CONTINUED TO BE COLD AND CLAMMY. DR. YOSHI HARRIS PRESENT AT THE 
STATION, NOTIFIED AND REPORTED ABOUT PATIENT'S PATI RECEIVED NEW ORDER FROM MD OF MORPGINE 
2MG SQ x1 DOSE. ALSO INFORMED MD THAT PATIENT'S BP /62 AND PER MD IT IS OKAY TO 
ADMINSTER. MORPHINE GIVEN AS ORDERED CONTINUED TO STAY TO MONITOR THE PATIENT AT THE 
BEDSIDE. ATTEMPTED TO INSERT A PERIPHERAL LINE SEVERAL TIMES BY 3 NURSES BUT PATIENT NOTED 
POOR PERFUSION AND COULD NOT START AN IV LINE DR. BELLE CALLED ER DOCTOR, DR. MCKENNA TO 
INSERT A CENTRAL LINE BUT PATIENT NOTED TO HAVE RAPID, SHALLOW BREATHING, AND COULD NOT 
DETERMINE BLOOD PRESSURE. ER MD, DR. MCKENNA TOOK OVER THE CARE AND PULSE RATE BEGAN TO 
DECREASE DAUGHTER CAROLYN PRESENT AT THE BEDSIDE. AT 10:18 NOTED NO RISE AND FALL OF THE 
CHEST, NO BREATHING OR PULSE NOTED. DR. MCKENNA DECLARED THE PATIENT DEAD. CAROLYN DAUGHTER 
AT THE BEDSIDE WAS GRATEFUL FOR ALL THE CARE HER MOTHER RECEIVED. PER CAROLYN SHE WILL 
CONTACT THEIR PREFERRED MORTUARY, [facility]. POST MORTEM CARE PROVIDED TO THE PATIENT, 
MAINTAINED DIGNITY WHILE DOING THE POST MORTEM CARE.